# Patient Record
(demographics unavailable — no encounter records)

---

## 2025-01-22 NOTE — REASON FOR VISIT
[Consultation] : a consultation visit [Patient] : patient [Mother] : mother [FreeTextEntry1] : Foot pain

## 2025-01-22 NOTE — REVIEW OF SYSTEMS
[NI] : Endocrine [Nl] : Hematologic/Lymphatic [Change in Activity] : no change in activity [Fever Above 102] : no fever [Malaise] : no malaise [Rash] : no rash Discharged

## 2025-01-22 NOTE — CONSULT LETTER
[Dear  ___] : Dear  [unfilled], [Consult Letter:] : I had the pleasure of evaluating your patient, [unfilled]. [Please see my note below.] : Please see my note below. [Consult Closing:] : Thank you very much for allowing me to participate in the care of this patient.  If you have any questions, please do not hesitate to contact me. [Sincerely,] : Sincerely, [FreeTextEntry3] : Tyler Collins MD [facility] [address] [place] [address] Phone: [phone] Fax: [phone]

## 2025-01-22 NOTE — ASSESSMENT
[FreeTextEntry1] : Diagnosis: Bilateral foot pain most likely related to muscle discomfort following physical activities.  The history was obtained today from the child and parent; given the patient's age and/or the child's mental capacity, the history was unreliable and the parent was used as an independent historian.  This is a 6-1/2-year-old boy who seems to complain of bilateral foot discomfort mainly at the end of the day who has a complete normal orthopedic exam.  X-rays of his feet are also within normal limits.  My impression is that he seems to complain of muscle and lower extremity fatigue following physical activities during the daytime. There are no clinical signs of concern for inflammation of malignancies.  Mother is explained at length about the reason for his symptoms. Should his complaints increase in frequency or intensity, his mother is told to contact the office for a full workup that may include blood tests and possibly a bone scan.  All of the mother's questions were addressed. She understood and agreed with the plan.  The office visit is conducted in English, the family's native language.  This note was generated using Dragon medical dictation software.  A reasonable effort has been made for proofreading its contents, but typos may still remain.  If there are any questions or points of clarification needed please do not hesitate to contact my office.

## 2025-01-22 NOTE — PHYSICAL EXAM
[FreeTextEntry1] : Alert, comfortable, well-developed in no apparent distress 6-1/2-year-old boy who allows to be examined. Normal gait pattern. No obvious clinical orthopedic deformities. No clinical leg length discrepancies. No swelling, deformities or bruises of the lower extremities Full flexion and extension of the hips, abduction with the hips in flexion is 60 bilaterally. Symmetrical internal/external rotation of the hips which are pain-free. Both patellas are properly located. Full flexion and extension of the knees, no locking. Meniscal maneuvers are negative. Both feet are well aligned, they're flexible, no calluses. No signs of metatarsus adductus. No cavus. No toe deformities. No clinically visible deformities of the upper extremities. No clinically visible differences in the length of the arms. Symmetrical range of motion of the shoulders, elbows, forearms and wrists. Spine clinically in the midline. Trunk well centered. No skin abnormalities or birthmarks. No plagiocephaly. No significant facial asymmetries. Abdomen soft, non-tender, no masses. No pain to percussion of renal fossae.

## 2025-01-22 NOTE — DATA REVIEWED
[de-identified] : X-rays of both feet standing taken today, including 3 views each, were reviewed by me.  They are within normal limits.

## 2025-01-22 NOTE — HISTORY OF PRESENT ILLNESS
[FreeTextEntry1] : Lawson is an otherwise healthy and active 6-1/2-year-old male brought in by his f mother after being sent by his pediatrician for an orthopedic evaluation of bilateral foot pain for the past month particularly at the end of the day which seem to be related to physical activities during the day. They are usually worse after very physically active days.  Mother and patient deny any injuries, no swelling, deformities or bruises. No fever or systemic symptoms. No nighttime pain. The pains seem to be sporadic and do not interfere with her regular activities.  Pain improves with massage and sometimes with Tylenol.